# Patient Record
Sex: MALE | Race: WHITE | Employment: OTHER | URBAN - METROPOLITAN AREA
[De-identification: names, ages, dates, MRNs, and addresses within clinical notes are randomized per-mention and may not be internally consistent; named-entity substitution may affect disease eponyms.]

---

## 2024-09-05 ENCOUNTER — APPOINTMENT (OUTPATIENT)
Facility: HOSPITAL | Age: 64
End: 2024-09-05
Payer: COMMERCIAL

## 2024-09-05 ENCOUNTER — HOSPITAL ENCOUNTER (EMERGENCY)
Facility: HOSPITAL | Age: 64
Discharge: HOME | End: 2024-09-05
Attending: EMERGENCY MEDICINE
Payer: COMMERCIAL

## 2024-09-05 VITALS
DIASTOLIC BLOOD PRESSURE: 84 MMHG | BODY MASS INDEX: 23.62 KG/M2 | SYSTOLIC BLOOD PRESSURE: 133 MMHG | TEMPERATURE: 98.1 F | HEART RATE: 79 BPM | HEIGHT: 70 IN | OXYGEN SATURATION: 97 % | RESPIRATION RATE: 14 BRPM | WEIGHT: 165 LBS

## 2024-09-05 DIAGNOSIS — E86.0 DEHYDRATION: ICD-10-CM

## 2024-09-05 DIAGNOSIS — R42 INTERMITTENT LIGHTHEADEDNESS: Primary | ICD-10-CM

## 2024-09-05 LAB
ALBUMIN SERPL-MCNC: 4.5 G/DL (ref 3.5–5.2)
ALBUMIN/GLOB SERPL: 1.7 (ref 1.1–2.2)
ALP SERPL-CCNC: 72 U/L (ref 40–129)
ALT SERPL-CCNC: 19 U/L (ref 10–50)
ANION GAP SERPL CALC-SCNC: 16 MMOL/L (ref 2–12)
AST SERPL-CCNC: 21 U/L (ref 10–50)
BASOPHILS # BLD: 0 K/UL (ref 0–1)
BASOPHILS NFR BLD: 0 % (ref 0–1)
BILIRUB SERPL-MCNC: 0.5 MG/DL (ref 0.2–1)
BUN SERPL-MCNC: 26 MG/DL (ref 8–23)
BUN/CREAT SERPL: 30 (ref 12–20)
CALCIUM SERPL-MCNC: 9.6 MG/DL (ref 8.8–10.2)
CHLORIDE SERPL-SCNC: 100 MMOL/L (ref 98–107)
CO2 SERPL-SCNC: 23 MMOL/L (ref 22–29)
COMMENT:: NORMAL
CREAT SERPL-MCNC: 0.86 MG/DL (ref 0.7–1.2)
DIFFERENTIAL METHOD BLD: NORMAL
EOSINOPHIL # BLD: 0.1 K/UL (ref 0–0.4)
EOSINOPHIL NFR BLD: 2 % (ref 0–7)
ERYTHROCYTE [DISTWIDTH] IN BLOOD BY AUTOMATED COUNT: 13 % (ref 11.5–14.5)
GLOBULIN SER CALC-MCNC: 2.6 G/DL (ref 2–4)
GLUCOSE BLD STRIP.AUTO-MCNC: 147 MG/DL (ref 65–117)
GLUCOSE SERPL-MCNC: 154 MG/DL (ref 65–100)
HCT VFR BLD AUTO: 39 % (ref 36.6–50.3)
HGB BLD-MCNC: 13.4 G/DL (ref 12.1–17)
IMM GRANULOCYTES # BLD AUTO: 0 K/UL (ref 0–0.04)
IMM GRANULOCYTES NFR BLD AUTO: 0 % (ref 0–0.5)
LYMPHOCYTES # BLD: 0.8 K/UL (ref 0.8–3.5)
LYMPHOCYTES NFR BLD: 17 % (ref 12–49)
MAGNESIUM SERPL-MCNC: 1.6 MG/DL (ref 1.6–2.4)
MCH RBC QN AUTO: 29.5 PG (ref 26–34)
MCHC RBC AUTO-ENTMCNC: 34.4 G/DL (ref 30–36.5)
MCV RBC AUTO: 85.9 FL (ref 80–99)
MONOCYTES # BLD: 0.3 K/UL (ref 0–1)
MONOCYTES NFR BLD: 6 % (ref 5–13)
NEUTS SEG # BLD: 3.6 K/UL (ref 1.8–8)
NEUTS SEG NFR BLD: 75 % (ref 32–75)
NRBC # BLD: 0 K/UL (ref 0–0.01)
NRBC BLD-RTO: 0 PER 100 WBC
PLATELET # BLD AUTO: 169 K/UL (ref 150–400)
PMV BLD AUTO: 9.9 FL (ref 8.9–12.9)
POTASSIUM SERPL-SCNC: 4.1 MMOL/L (ref 3.5–5.1)
PROT SERPL-MCNC: 7.1 G/DL (ref 6.4–8.3)
RBC # BLD AUTO: 4.54 M/UL (ref 4.1–5.7)
SERVICE CMNT-IMP: ABNORMAL
SODIUM SERPL-SCNC: 139 MMOL/L (ref 136–145)
SPECIMEN HOLD: NORMAL
TROPONIN T SERPL HS-MCNC: 14.7 NG/L (ref 0–22)
TROPONIN T SERPL HS-MCNC: 15.8 NG/L (ref 0–22)
WBC # BLD AUTO: 4.9 K/UL (ref 4.1–11.1)

## 2024-09-05 PROCEDURE — 82962 GLUCOSE BLOOD TEST: CPT

## 2024-09-05 PROCEDURE — 36415 COLL VENOUS BLD VENIPUNCTURE: CPT

## 2024-09-05 PROCEDURE — 2580000003 HC RX 258: Performed by: EMERGENCY MEDICINE

## 2024-09-05 PROCEDURE — 99285 EMERGENCY DEPT VISIT HI MDM: CPT

## 2024-09-05 PROCEDURE — 84484 ASSAY OF TROPONIN QUANT: CPT

## 2024-09-05 PROCEDURE — 80053 COMPREHEN METABOLIC PANEL: CPT

## 2024-09-05 PROCEDURE — 96374 THER/PROPH/DIAG INJ IV PUSH: CPT

## 2024-09-05 PROCEDURE — 83735 ASSAY OF MAGNESIUM: CPT

## 2024-09-05 PROCEDURE — 93005 ELECTROCARDIOGRAM TRACING: CPT | Performed by: EMERGENCY MEDICINE

## 2024-09-05 PROCEDURE — 85025 COMPLETE CBC W/AUTO DIFF WBC: CPT

## 2024-09-05 PROCEDURE — 71045 X-RAY EXAM CHEST 1 VIEW: CPT

## 2024-09-05 RX ORDER — ONDANSETRON 2 MG/ML
4 INJECTION INTRAMUSCULAR; INTRAVENOUS
Status: DISCONTINUED | OUTPATIENT
Start: 2024-09-05 | End: 2024-09-05 | Stop reason: HOSPADM

## 2024-09-05 RX ORDER — 0.9 % SODIUM CHLORIDE 0.9 %
1000 INTRAVENOUS SOLUTION INTRAVENOUS ONCE
Status: COMPLETED | OUTPATIENT
Start: 2024-09-05 | End: 2024-09-05

## 2024-09-05 RX ORDER — GLUCOSAMINE HCL/CHONDROITIN SU 500-400 MG
CAPSULE ORAL
Qty: 200 STRIP | Refills: 0 | Status: SHIPPED | OUTPATIENT
Start: 2024-09-05

## 2024-09-05 RX ADMIN — SODIUM CHLORIDE 1000 ML: 9 INJECTION, SOLUTION INTRAVENOUS at 13:34

## 2024-09-05 ASSESSMENT — PAIN - FUNCTIONAL ASSESSMENT: PAIN_FUNCTIONAL_ASSESSMENT: NONE - DENIES PAIN

## 2024-09-05 ASSESSMENT — LIFESTYLE VARIABLES
HOW MANY STANDARD DRINKS CONTAINING ALCOHOL DO YOU HAVE ON A TYPICAL DAY: PATIENT DOES NOT DRINK
HOW OFTEN DO YOU HAVE A DRINK CONTAINING ALCOHOL: NEVER

## 2024-09-05 NOTE — ED PROVIDER NOTES
[facility] [place]  EMERGENCY DEPARTMENT ENCOUNTER      Pt Name: Jorge Duke  MRN: [id number]  Birthdate 1960  Date of evaluation: 9/5/2024  Provider: Oneil Gutierrez DO      HISTORY OF PRESENT ILLNESS      HPI  63-year-old male with a history of HTN, DM, CAD status post stent placement few years ago presents to the emergency department stating that he was putting together a bed frame and began feeling lightheaded and diaphoretic mildly nauseous around 1130.  He states that he then sat down and rested and his symptoms improved.  But then when he restarted work he felt them again so they wanted to come to the emergency department to get checked out.  He is taking aspirin and Plavix.  He notes some slight fatigue currently but denies any nausea or chest pain or difficulty breathing.  He states he has not eaten yet today but before he has been eating and drinking normally.  No recent illness or fever or chills or cough or URI symptoms.  No recent GI illness.      Nursing Notes were reviewed.    REVIEW OF SYSTEMS         Review of Systems        PAST MEDICAL HISTORY     Past Medical History:   Diagnosis Date    Diabetes mellitus (HCC)     Hypertension     MI (myocardial infarction) (HCC) 07/2019    3 stents placed         SURGICAL HISTORY     History reviewed. No pertinent surgical history.      CURRENT MEDICATIONS       Previous Medications    No medications on file       ALLERGIES     Patient has no known allergies.    FAMILY HISTORY     History reviewed. No pertinent family history.       SOCIAL HISTORY       Social History     Socioeconomic History    Marital status:      Spouse name: None    Number of children: None    Years of education: None    Highest education level: None   Tobacco Use    Smoking status: Never    Smokeless tobacco: Never   Substance and Sexual Activity    Alcohol use: Not Currently    Drug use: Never         PHYSICAL EXAM       ED Triage Vitals [09/05/24 1311]   BP Systolic BP

## 2024-09-05 NOTE — ED TRIAGE NOTES
Pt ambulatory into ER with cc of near syncope, diaphoresis and nausea that occurred today around 1130 when assembling a bed frame. Pt reports hx of MI in 7/19 with 3 stents placed. Pt reports continued fatigue now, but denies nausea. Pt denies current pain.     Pt on Plavix. Pt reports daily use of 81mg ASA.

## 2024-09-05 NOTE — ED NOTES
Pt given discharge instructions, patient education, 1 prescription and follow up information. Pt verbalizes understanding. All questions answered. Pt discharged to home in private vehicle, ambulatory. Pt A&Ox4, RA, pain controlled.

## 2024-09-05 NOTE — ED NOTES
Verbal shift change report given to Adrianna Rn (oncoming nurse) by Vladimir RN (offgoing nurse). Report included the following information Nurse Handoff Report.

## 2024-09-05 NOTE — ED PROVIDER NOTES
ED SIGN OUT NOTE  Care assumed at [facility] 3:00 PM EDT    Patient was signed out to me by Dr. Gutierrez.     Patient signed out pending lab results, re-evaluation, and disposition      /84   Pulse 79   Temp 98.1 °F (36.7 °C) (Oral)   Resp 14   Ht 1.778 m (5' 10\")   Wt 74.8 kg (165 lb)   SpO2 97%   BMI 23.68 kg/m²   Labs Reviewed   COMPREHENSIVE METABOLIC PANEL - Abnormal; Notable for the following components:       Result Value    Anion Gap 16 (*)     Glucose 154 (*)     BUN 26 (*)     BUN/Creatinine Ratio 30 (*)     All other components within normal limits   POCT GLUCOSE - Abnormal; Notable for the following components:    POC Glucose 147 (*)     All other components within normal limits   EXTRA TUBES HOLD   CBC WITH AUTO DIFFERENTIAL   TROPONIN T   MAGNESIUM   TROPONIN T   POCT GLUCOSE     XR CHEST PORTABLE   Final Result      No acute process on portable chest.         Electronically signed by NITA NAVAS        ED Course as of 09/05/24 1634   Thu Sep 05, 2024   1308 1:03 PM EKG shows normal sinus rhythm with a rate of 87 bpm with no acute ST or T wave abnormality suggestive of ischemia. [WJ]      ED Course User Index  [WJ] Oneil Gutierrez, DO     Diagnosis:   1. Intermittent lightheadedness    2. Dehydration      Disposition:   Decision To Discharge 09/05/2024 04:20:47 PM        Plan:     Patient presented for near syncopal episode, positional, appeared quite dry on arrival. No chest pain or dyspnea. Given fluids and had improvement in symptoms. Patient signed out pending repeat troponin, re-eval and disposition.       Patient re-evaluated.  Feeling well and requesting discharge.  States that he said no repeat symptoms after he had some fluids and generally feels well, denies any nausea, vomiting, diaphoresis, chest pain, dyspnea.  Delta troponin -1.  Patient does incidentally request strips Rx for his glucose monitor at home which I am happy to provide.  Close return precautions  discussed, overall low suspicion for sinister process given benign exam and reassuring workup.  He will follow-up.    Diagnosis is lightheadedness, dehydration. Disposition is Discharge with PCP follow-up. Workup and plan discussed with patient , return precautions given for worsening or concerning symptoms, all questions answered, and they are in agreement with the  plan .       DO Ольга Knight Marissa, DO  09/05/24 2094

## 2024-09-07 LAB
EKG ATRIAL RATE: 87 BPM
EKG DIAGNOSIS: NORMAL
EKG P AXIS: 0 DEGREES
EKG P-R INTERVAL: 146 MS
EKG Q-T INTERVAL: 378 MS
EKG QRS DURATION: 104 MS
EKG QTC CALCULATION (BAZETT): 454 MS
EKG R AXIS: 48 DEGREES
EKG T AXIS: 0 DEGREES
EKG VENTRICULAR RATE: 87 BPM

## 2024-09-07 PROCEDURE — 93010 ELECTROCARDIOGRAM REPORT: CPT | Performed by: SPECIALIST

## 2025-05-04 ENCOUNTER — HOSPITAL ENCOUNTER (EMERGENCY)
Facility: HOSPITAL | Age: 65
Discharge: HOME | End: 2025-05-04
Attending: STUDENT IN AN ORGANIZED HEALTH CARE EDUCATION/TRAINING PROGRAM
Payer: COMMERCIAL

## 2025-05-04 VITALS
TEMPERATURE: 97.7 F | SYSTOLIC BLOOD PRESSURE: 131 MMHG | HEIGHT: 70 IN | DIASTOLIC BLOOD PRESSURE: 75 MMHG | WEIGHT: 170 LBS | RESPIRATION RATE: 28 BRPM | HEART RATE: 83 BPM | OXYGEN SATURATION: 95 % | BODY MASS INDEX: 24.34 KG/M2

## 2025-05-04 DIAGNOSIS — R11.2 NAUSEA AND VOMITING, UNSPECIFIED VOMITING TYPE: Primary | ICD-10-CM

## 2025-05-04 DIAGNOSIS — R10.32 LEFT LOWER QUADRANT ABDOMINAL PAIN: ICD-10-CM

## 2025-05-04 LAB
ALBUMIN SERPL-MCNC: 4.6 G/DL (ref 3.5–5.2)
ALBUMIN/GLOB SERPL: 1.7 (ref 1.1–2.2)
ALP SERPL-CCNC: 74 U/L (ref 40–129)
ALT SERPL-CCNC: 19 U/L (ref 10–50)
ANION GAP SERPL CALC-SCNC: 15 MMOL/L (ref 2–12)
AST SERPL-CCNC: 16 U/L (ref 10–50)
BASOPHILS # BLD: 0.03 K/UL (ref 0–0.1)
BASOPHILS NFR BLD: 0.4 % (ref 0–1)
BILIRUB SERPL-MCNC: 0.3 MG/DL (ref 0.2–1)
BUN SERPL-MCNC: 32 MG/DL (ref 8–23)
BUN/CREAT SERPL: 27 (ref 12–20)
CALCIUM SERPL-MCNC: 10.3 MG/DL (ref 8.8–10.2)
CHLORIDE SERPL-SCNC: 102 MMOL/L (ref 98–107)
CO2 SERPL-SCNC: 24 MMOL/L (ref 22–29)
COMMENT:: NORMAL
CREAT SERPL-MCNC: 1.18 MG/DL (ref 0.7–1.2)
DIFFERENTIAL METHOD BLD: ABNORMAL
EOSINOPHIL # BLD: 0.12 K/UL (ref 0–0.4)
EOSINOPHIL NFR BLD: 1.7 % (ref 0–7)
ERYTHROCYTE [DISTWIDTH] IN BLOOD BY AUTOMATED COUNT: 13.5 % (ref 11.5–14.5)
GLOBULIN SER CALC-MCNC: 2.7 G/DL (ref 2–4)
GLUCOSE SERPL-MCNC: 174 MG/DL (ref 65–100)
HCT VFR BLD AUTO: 39.6 % (ref 36.6–50.3)
HGB BLD-MCNC: 13.3 G/DL (ref 12.1–17)
IMM GRANULOCYTES # BLD AUTO: 0.03 K/UL (ref 0–0.04)
IMM GRANULOCYTES NFR BLD AUTO: 0.4 % (ref 0–0.5)
LYMPHOCYTES # BLD: 0.74 K/UL (ref 0.8–3.5)
LYMPHOCYTES NFR BLD: 10.6 % (ref 12–49)
MCH RBC QN AUTO: 28.7 PG (ref 26–34)
MCHC RBC AUTO-ENTMCNC: 33.6 G/DL (ref 30–36.5)
MCV RBC AUTO: 85.3 FL (ref 80–99)
MONOCYTES # BLD: 0.34 K/UL (ref 0–1)
MONOCYTES NFR BLD: 4.9 % (ref 5–13)
NEUTS SEG # BLD: 5.74 K/UL (ref 1.8–8)
NEUTS SEG NFR BLD: 82 % (ref 32–75)
NRBC # BLD: 0 K/UL (ref 0–0.01)
NRBC BLD-RTO: 0 PER 100 WBC
PLATELET # BLD AUTO: 167 K/UL (ref 150–400)
PMV BLD AUTO: 9.7 FL (ref 8.9–12.9)
POTASSIUM SERPL-SCNC: 4.7 MMOL/L (ref 3.5–5.1)
PROT SERPL-MCNC: 7.3 G/DL (ref 6.4–8.3)
RBC # BLD AUTO: 4.64 M/UL (ref 4.1–5.7)
RBC MORPH BLD: ABNORMAL
SODIUM SERPL-SCNC: 141 MMOL/L (ref 136–145)
SPECIMEN HOLD: NORMAL
WBC # BLD AUTO: 7 K/UL (ref 4.1–11.1)
WBC MORPH BLD: ABNORMAL

## 2025-05-04 PROCEDURE — 36415 COLL VENOUS BLD VENIPUNCTURE: CPT

## 2025-05-04 PROCEDURE — 96360 HYDRATION IV INFUSION INIT: CPT

## 2025-05-04 PROCEDURE — 93005 ELECTROCARDIOGRAM TRACING: CPT | Performed by: STUDENT IN AN ORGANIZED HEALTH CARE EDUCATION/TRAINING PROGRAM

## 2025-05-04 PROCEDURE — 99284 EMERGENCY DEPT VISIT MOD MDM: CPT

## 2025-05-04 PROCEDURE — 85025 COMPLETE CBC W/AUTO DIFF WBC: CPT

## 2025-05-04 PROCEDURE — 80053 COMPREHEN METABOLIC PANEL: CPT

## 2025-05-04 PROCEDURE — 2580000003 HC RX 258: Performed by: STUDENT IN AN ORGANIZED HEALTH CARE EDUCATION/TRAINING PROGRAM

## 2025-05-04 RX ORDER — 0.9 % SODIUM CHLORIDE 0.9 %
1000 INTRAVENOUS SOLUTION INTRAVENOUS ONCE
Status: COMPLETED | OUTPATIENT
Start: 2025-05-04 | End: 2025-05-04

## 2025-05-04 RX ORDER — ONDANSETRON 4 MG/1
4 TABLET, FILM COATED ORAL 3 TIMES DAILY PRN
Qty: 15 TABLET | Refills: 0 | Status: SHIPPED | OUTPATIENT
Start: 2025-05-04

## 2025-05-04 RX ORDER — ONDANSETRON 2 MG/ML
4 INJECTION INTRAMUSCULAR; INTRAVENOUS ONCE
Status: DISCONTINUED | OUTPATIENT
Start: 2025-05-04 | End: 2025-05-05 | Stop reason: HOSPADM

## 2025-05-04 RX ADMIN — SODIUM CHLORIDE 1000 ML: 0.9 INJECTION, SOLUTION INTRAVENOUS at 21:05

## 2025-05-04 ASSESSMENT — PAIN - FUNCTIONAL ASSESSMENT: PAIN_FUNCTIONAL_ASSESSMENT: NONE - DENIES PAIN

## 2025-05-05 LAB
EKG ATRIAL RATE: 90 BPM
EKG DIAGNOSIS: NORMAL
EKG P AXIS: 57 DEGREES
EKG P-R INTERVAL: 156 MS
EKG Q-T INTERVAL: 362 MS
EKG QRS DURATION: 102 MS
EKG QTC CALCULATION (BAZETT): 442 MS
EKG R AXIS: 15 DEGREES
EKG T AXIS: 59 DEGREES
EKG VENTRICULAR RATE: 90 BPM

## 2025-05-05 PROCEDURE — 93010 ELECTROCARDIOGRAM REPORT: CPT | Performed by: SPECIALIST

## 2025-05-05 NOTE — DISCHARGE INSTRUCTIONS
Please return to the ER if you develop fevers, persistent abdominal pain, vomiting and cannot keep fluids down, any other concerns or problems.

## 2025-05-05 NOTE — ED TRIAGE NOTES
Pt ambulatory to ED w/ c/o LLQ abdominal pain starting approx 3 hours ago. Pt endorses pain has quick onset and is sharp, followed by vomiting. Pt denies current pain or diarrhea. Pt endorses last BM this morning x 2 and loose. Abdomen non tender in triage.

## 2025-05-05 NOTE — ED PROVIDER NOTES
[place] EMERGENCY DEPARTMENT  EMERGENCY DEPARTMENT ENCOUNTER      Pt Name: Jorge Duke  MRN: [id number]  Birthdate 1960  Date of evaluation: 5/4/2025  Provider: Siomara Irvin DO    CHIEF COMPLAINT       Chief Complaint   Patient presents with    Abdominal Pain    Vomiting         HISTORY OF PRESENT ILLNESS    HPI    Jorge Duke is a 64 y.o. male who presents to the emergency department with complaints of abdominal pain and nausea that began around 5-6 pm tonight after eating a meal. States his abdominal pain is intermittent and localized to the LLQ. When the pain comes, he feels clammy and dizzy and then he vomits and begins to feel better. States he had two loose stools this morning. Denies diarrhea or bloody stools.     Nursing Notes were reviewed.    REVIEW OF SYSTEMS       Review of Systems   Constitutional:  Negative for chills and fever.   Respiratory:  Positive for shortness of breath.    Cardiovascular:  Negative for chest pain.   Gastrointestinal:  Positive for nausea and vomiting. Negative for blood in stool.   Skin:  Negative for pallor.   Neurological:  Positive for light-headedness. Negative for weakness.           PAST MEDICAL HISTORY     Past Medical History:   Diagnosis Date    Diabetes mellitus (HCC)     Hypertension     MI (myocardial infarction) (HCC) 07/2019    3 stents placed         SURGICAL HISTORY     History reviewed. No pertinent surgical history.      CURRENT MEDICATIONS       Discharge Medication List as of 5/4/2025 10:41 PM        CONTINUE these medications which have NOT CHANGED    Details   blood glucose monitor strips Test 3 times a day & as needed for symptoms of irregular blood glucose. Dispense sufficient amount for indicated testing frequency plus additional to accommodate PRN testing needs., Disp-200 strip, R-0, Normal             ALLERGIES     Patient has no known allergies.    FAMILY HISTORY     History reviewed. No pertinent family history.       SOCIAL

## 2025-05-05 NOTE — ED NOTES
Pt given water and saltine crackers for PO challenge. Pt denies any nausea or LLQ pain since arrival to ED.